# Patient Record
Sex: FEMALE | Race: WHITE | NOT HISPANIC OR LATINO | Employment: FULL TIME | URBAN - METROPOLITAN AREA
[De-identification: names, ages, dates, MRNs, and addresses within clinical notes are randomized per-mention and may not be internally consistent; named-entity substitution may affect disease eponyms.]

---

## 2019-05-27 ENCOUNTER — HOSPITAL ENCOUNTER (EMERGENCY)
Facility: HOSPITAL | Age: 34
Discharge: HOME | End: 2019-05-28
Attending: EMERGENCY MEDICINE
Payer: MEDICAID

## 2019-05-27 DIAGNOSIS — N83.202 HEMORRHAGIC CYST OF LEFT OVARY: Primary | ICD-10-CM

## 2019-05-27 DIAGNOSIS — R10.9 ABDOMINAL PAIN, UNSPECIFIED ABDOMINAL LOCATION: ICD-10-CM

## 2019-05-27 DIAGNOSIS — R00.1 BRADYCARDIA: ICD-10-CM

## 2019-05-27 DIAGNOSIS — B96.89 BACTERIAL VAGINOSIS: ICD-10-CM

## 2019-05-27 DIAGNOSIS — N76.0 BACTERIAL VAGINOSIS: ICD-10-CM

## 2019-05-27 LAB
ALBUMIN SERPL BCP-MCNC: 4.4 G/DL (ref 3.5–5.2)
ALP SERPL-CCNC: 39 U/L (ref 55–135)
ALT SERPL W/O P-5'-P-CCNC: 13 U/L (ref 10–44)
ANION GAP SERPL CALC-SCNC: 6 MMOL/L (ref 8–16)
AST SERPL-CCNC: 15 U/L (ref 10–40)
B-HCG UR QL: NEGATIVE
BACTERIA GENITAL QL WET PREP: ABNORMAL
BASOPHILS # BLD AUTO: 0.01 K/UL (ref 0–0.2)
BASOPHILS NFR BLD: 0.2 % (ref 0–1.9)
BILIRUB SERPL-MCNC: 0.6 MG/DL (ref 0.1–1)
BILIRUB UR QL STRIP: NEGATIVE
BUN SERPL-MCNC: 12 MG/DL (ref 6–20)
CALCIUM SERPL-MCNC: 9.5 MG/DL (ref 8.7–10.5)
CHLORIDE SERPL-SCNC: 107 MMOL/L (ref 95–110)
CLARITY UR: CLEAR
CLUE CELLS VAG QL WET PREP: ABNORMAL
CO2 SERPL-SCNC: 23 MMOL/L (ref 23–29)
COLOR UR: YELLOW
CREAT SERPL-MCNC: 0.9 MG/DL (ref 0.5–1.4)
DIFFERENTIAL METHOD: ABNORMAL
EOSINOPHIL # BLD AUTO: 0.3 K/UL (ref 0–0.5)
EOSINOPHIL NFR BLD: 4.2 % (ref 0–8)
ERYTHROCYTE [DISTWIDTH] IN BLOOD BY AUTOMATED COUNT: 12.8 % (ref 11.5–14.5)
EST. GFR  (AFRICAN AMERICAN): >60 ML/MIN/1.73 M^2
EST. GFR  (NON AFRICAN AMERICAN): >60 ML/MIN/1.73 M^2
FILAMENT FUNGI VAG WET PREP-#/AREA: ABNORMAL
GLUCOSE SERPL-MCNC: 81 MG/DL (ref 70–110)
GLUCOSE UR QL STRIP: NEGATIVE
HCT VFR BLD AUTO: 38 % (ref 37–48.5)
HGB BLD-MCNC: 13.1 G/DL (ref 12–16)
HGB UR QL STRIP: NEGATIVE
KETONES UR QL STRIP: NEGATIVE
LEUKOCYTE ESTERASE UR QL STRIP: NEGATIVE
LIPASE SERPL-CCNC: 17 U/L (ref 4–60)
LYMPHOCYTES # BLD AUTO: 1.6 K/UL (ref 1–4.8)
LYMPHOCYTES NFR BLD: 27.1 % (ref 18–48)
MCH RBC QN AUTO: 31.5 PG (ref 27–31)
MCHC RBC AUTO-ENTMCNC: 34.5 G/DL (ref 32–36)
MCV RBC AUTO: 91 FL (ref 82–98)
MONOCYTES # BLD AUTO: 0.5 K/UL (ref 0.3–1)
MONOCYTES NFR BLD: 9.1 % (ref 4–15)
NEUTROPHILS # BLD AUTO: 3.5 K/UL (ref 1.8–7.7)
NEUTROPHILS NFR BLD: 59.4 % (ref 38–73)
NITRITE UR QL STRIP: NEGATIVE
PH UR STRIP: 6 [PH] (ref 5–8)
PLATELET # BLD AUTO: 202 K/UL (ref 150–350)
PMV BLD AUTO: 10.8 FL (ref 9.2–12.9)
POTASSIUM SERPL-SCNC: 3.8 MMOL/L (ref 3.5–5.1)
PROT SERPL-MCNC: 7.2 G/DL (ref 6–8.4)
PROT UR QL STRIP: NEGATIVE
RBC # BLD AUTO: 4.16 M/UL (ref 4–5.4)
SODIUM SERPL-SCNC: 136 MMOL/L (ref 136–145)
SP GR UR STRIP: 1.01 (ref 1–1.03)
SPECIMEN SOURCE: ABNORMAL
T VAGINALIS GENITAL QL WET PREP: ABNORMAL
URN SPEC COLLECT METH UR: NORMAL
UROBILINOGEN UR STRIP-ACNC: NEGATIVE EU/DL
WBC # BLD AUTO: 5.94 K/UL (ref 3.9–12.7)
WBC #/AREA VAG WET PREP: ABNORMAL
YEAST GENITAL QL WET PREP: ABNORMAL

## 2019-05-27 PROCEDURE — 81025 URINE PREGNANCY TEST: CPT

## 2019-05-27 PROCEDURE — 80053 COMPREHEN METABOLIC PANEL: CPT

## 2019-05-27 PROCEDURE — 96361 HYDRATE IV INFUSION ADD-ON: CPT

## 2019-05-27 PROCEDURE — 93010 ELECTROCARDIOGRAM REPORT: CPT | Mod: ,,, | Performed by: INTERNAL MEDICINE

## 2019-05-27 PROCEDURE — 63600175 PHARM REV CODE 636 W HCPCS: Performed by: EMERGENCY MEDICINE

## 2019-05-27 PROCEDURE — 81003 URINALYSIS AUTO W/O SCOPE: CPT

## 2019-05-27 PROCEDURE — 96375 TX/PRO/DX INJ NEW DRUG ADDON: CPT

## 2019-05-27 PROCEDURE — 25000003 PHARM REV CODE 250: Performed by: EMERGENCY MEDICINE

## 2019-05-27 PROCEDURE — 93010 EKG 12-LEAD: ICD-10-PCS | Mod: ,,, | Performed by: INTERNAL MEDICINE

## 2019-05-27 PROCEDURE — 96374 THER/PROPH/DIAG INJ IV PUSH: CPT

## 2019-05-27 PROCEDURE — 93005 ELECTROCARDIOGRAM TRACING: CPT

## 2019-05-27 PROCEDURE — 87491 CHLMYD TRACH DNA AMP PROBE: CPT

## 2019-05-27 PROCEDURE — 99284 EMERGENCY DEPT VISIT MOD MDM: CPT

## 2019-05-27 PROCEDURE — 36415 COLL VENOUS BLD VENIPUNCTURE: CPT

## 2019-05-27 PROCEDURE — 85025 COMPLETE CBC W/AUTO DIFF WBC: CPT

## 2019-05-27 PROCEDURE — 83690 ASSAY OF LIPASE: CPT

## 2019-05-27 PROCEDURE — 96376 TX/PRO/DX INJ SAME DRUG ADON: CPT

## 2019-05-27 PROCEDURE — 87210 SMEAR WET MOUNT SALINE/INK: CPT

## 2019-05-27 RX ORDER — FENTANYL CITRATE 50 UG/ML
25 INJECTION, SOLUTION INTRAMUSCULAR; INTRAVENOUS ONCE
Status: COMPLETED | OUTPATIENT
Start: 2019-05-27 | End: 2019-05-27

## 2019-05-27 RX ORDER — ONDANSETRON 2 MG/ML
4 INJECTION INTRAMUSCULAR; INTRAVENOUS ONCE
Status: COMPLETED | OUTPATIENT
Start: 2019-05-27 | End: 2019-05-27

## 2019-05-27 RX ORDER — CLONAZEPAM 0.5 MG/1
0.5 TABLET ORAL 2 TIMES DAILY PRN
COMMUNITY

## 2019-05-27 RX ADMIN — FENTANYL CITRATE 25 MCG: 50 INJECTION, SOLUTION INTRAMUSCULAR; INTRAVENOUS at 08:05

## 2019-05-27 RX ADMIN — FENTANYL CITRATE 25 MCG: 50 INJECTION, SOLUTION INTRAMUSCULAR; INTRAVENOUS at 11:05

## 2019-05-27 RX ADMIN — ONDANSETRON 4 MG: 2 INJECTION INTRAMUSCULAR; INTRAVENOUS at 07:05

## 2019-05-27 RX ADMIN — DICYCLOMINE HYDROCHLORIDE 50 ML: 10 SOLUTION ORAL at 07:05

## 2019-05-27 RX ADMIN — SODIUM CHLORIDE 1000 ML: 0.9 INJECTION, SOLUTION INTRAVENOUS at 07:05

## 2019-05-27 NOTE — ED PROVIDER NOTES
"SCRIBE #1 NOTE: I, Regis Martin, am scribing for, and in the presence of, Roxana Cool DO. I have scribed the entire note.       History     Chief Complaint   Patient presents with    Abdominal Pain     pt c/o epigastric abd pain, pelvic pain, decreased appetite, and intermittent abd bloating, 1 week     Review of patient's allergies indicates:  No Known Allergies      History of Present Illness     HPI    5/27/2019, 7:10 PM  History obtained from the patient      History of Present Illness: Jalyn Lim is a 33 y.o. female patient with a PMHx of ovarian cysts who presents to the Emergency Department for evaluation of diffuse abd pain which onset gradually 1.5 weeks ago. Pt reports being "unable to think straight" as a result of her pain, which she describes as a "sharp hunger pain" and rates 10/10.  Symptoms are constant and moderate in severity. Pt reports worsening pain with movement and sitting up. No other mitigating or exacerbating factors reported. Associated sxs include back pain, abd bloat, dizziness, lightheadedness, vomiting, vaginal discharge, vaginal bleeding. Patient denies any dysuria, hematuria, fever, chills, diaphoresis, congestion, CP, palpitations, leg swelling, nausea, neck pain, HA, weakness, and all other sxs at this time. Prior Tx includes ibuprofen taken 7 hours PTA. No further complaints or concerns at this time.         Arrival mode: Personal vehicle     PCP: Primary Doctor No      Past Medical History:  History reviewed. No pertinent medical history.    Past Surgical History:  History reviewed. No pertinent surgical history.    Family History:  History reviewed. No pertinent family history.    Social History:  No tobacco, alcohol, drugs.   Review of Systems     Review of Systems   Constitutional: Negative for chills, diaphoresis and fever.   HENT: Negative for congestion and sore throat.    Respiratory: Negative for shortness of breath.    Cardiovascular: Negative for " chest pain, palpitations and leg swelling.   Gastrointestinal: Positive for abdominal distention, abdominal pain (diffuse) and vomiting. Negative for nausea.   Genitourinary: Positive for vaginal bleeding and vaginal discharge. Negative for dysuria and hematuria.   Musculoskeletal: Positive for back pain. Negative for neck pain.   Skin: Negative for rash.   Neurological: Positive for dizziness and light-headedness. Negative for weakness and headaches.   Hematological: Does not bruise/bleed easily.   All other systems reviewed and are negative.       Physical Exam     Initial Vitals [05/27/19 1830]   BP Pulse Resp Temp SpO2   (!) 89/61 73 20 98.5 °F (36.9 °C) 98 %      MAP       --          Physical Exam  Nursing Notes and Vital Signs Reviewed.  Constitutional: Patient is in mild distress. Well-developed and well-nourished.  Head: Atraumatic. Normocephalic.  Eyes: PERRL. EOM intact. Conjunctivae are not pale. No scleral icterus.  ENT: Mucous membranes are moist. Oropharynx is clear and symmetric.    Neck: Supple. Full ROM. No lymphadenopathy.  Cardiovascular: Regular rate. Regular rhythm. No murmurs, rubs, or gallops. Distal pulses are 2+ and symmetric.  Pulmonary/Chest: No respiratory distress. Clear to auscultation bilaterally. No wheezing or rales.  Abdominal: Soft and non-distended.  There is LLQ tenderness.  No rebound, guarding, or rigidity.  Genitourinary: No CVA tenderness  Musculoskeletal: Moves all extremities. No obvious deformities. No edema.  Skin: Warm and dry.  Neurological:  Alert, awake, and appropriate.  Normal speech.  No acute focal neurological deficits are appreciated.  Psychiatric: Normal affect. Good eye contact. Appropriate in content.     ED Course   Procedures  ED Vital Signs:  Vitals:    05/27/19 1830 05/27/19 1838 05/27/19 1932 05/27/19 2032   BP: (!) 89/61 (!) 102/59  103/68   Pulse: 73 77 (!) 44 62   Resp: 20   18   Temp: 98.5 °F (36.9 °C)      TempSrc: Oral      SpO2: 98% 99%  100%  "  Weight: 63.5 kg (140 lb) 62 kg (136 lb 9.6 oz)     Height: 5' 6" (1.676 m)       05/27/19 2102 05/27/19 2351 05/28/19 0100   BP: 91/60 (!) 88/54 102/66   Pulse: 65 68 60   Resp: 18 17 19   Temp:      TempSrc:      SpO2: 97% 100% 98%   Weight:      Height:          Abnormal Lab Results:  Labs Reviewed   CBC W/ AUTO DIFFERENTIAL - Abnormal; Notable for the following components:       Result Value    Mean Corpuscular Hemoglobin 31.5 (*)     All other components within normal limits   COMPREHENSIVE METABOLIC PANEL - Abnormal; Notable for the following components:    Alkaline Phosphatase 39 (*)     Anion Gap 6 (*)     All other components within normal limits   VAGINAL SCREEN - Abnormal; Notable for the following components:    Clue Cells Occasional (*)     WBC - Vaginal Screen Rare (*)     Bacteria - Vaginal Screen Moderate (*)     All other components within normal limits   C. TRACHOMATIS/N. GONORRHOEAE BY AMP DNA   LIPASE   URINALYSIS, REFLEX TO URINE CULTURE    Narrative:     Preferred Collection Type->Urine, Clean Catch   PREGNANCY TEST, URINE RAPID        All Lab Results:  Results for orders placed or performed during the hospital encounter of 05/27/19   CBC auto differential   Result Value Ref Range    WBC 5.94 3.90 - 12.70 K/uL    RBC 4.16 4.00 - 5.40 M/uL    Hemoglobin 13.1 12.0 - 16.0 g/dL    Hematocrit 38.0 37.0 - 48.5 %    Mean Corpuscular Volume 91 82 - 98 fL    Mean Corpuscular Hemoglobin 31.5 (H) 27.0 - 31.0 pg    Mean Corpuscular Hemoglobin Conc 34.5 32.0 - 36.0 g/dL    RDW 12.8 11.5 - 14.5 %    Platelets 202 150 - 350 K/uL    MPV 10.8 9.2 - 12.9 fL    Gran # (ANC) 3.5 1.8 - 7.7 K/uL    Lymph # 1.6 1.0 - 4.8 K/uL    Mono # 0.5 0.3 - 1.0 K/uL    Eos # 0.3 0.0 - 0.5 K/uL    Baso # 0.01 0.00 - 0.20 K/uL    Gran% 59.4 38.0 - 73.0 %    Lymph% 27.1 18.0 - 48.0 %    Mono% 9.1 4.0 - 15.0 %    Eosinophil% 4.2 0.0 - 8.0 %    Basophil% 0.2 0.0 - 1.9 %    Differential Method Automated    Comprehensive metabolic " panel   Result Value Ref Range    Sodium 136 136 - 145 mmol/L    Potassium 3.8 3.5 - 5.1 mmol/L    Chloride 107 95 - 110 mmol/L    CO2 23 23 - 29 mmol/L    Glucose 81 70 - 110 mg/dL    BUN, Bld 12 6 - 20 mg/dL    Creatinine 0.9 0.5 - 1.4 mg/dL    Calcium 9.5 8.7 - 10.5 mg/dL    Total Protein 7.2 6.0 - 8.4 g/dL    Albumin 4.4 3.5 - 5.2 g/dL    Total Bilirubin 0.6 0.1 - 1.0 mg/dL    Alkaline Phosphatase 39 (L) 55 - 135 U/L    AST 15 10 - 40 U/L    ALT 13 10 - 44 U/L    Anion Gap 6 (L) 8 - 16 mmol/L    eGFR if African American >60 >60 mL/min/1.73 m^2    eGFR if non African American >60 >60 mL/min/1.73 m^2   Lipase   Result Value Ref Range    Lipase 17 4 - 60 U/L   Urinalysis, Reflex to Urine Culture Urine, Clean Catch   Result Value Ref Range    Specimen UA Urine, Clean Catch     Color, UA Yellow Yellow, Straw, Ruby    Appearance, UA Clear Clear    pH, UA 6.0 5.0 - 8.0    Specific Gravity, UA 1.015 1.005 - 1.030    Protein, UA Negative Negative    Glucose, UA Negative Negative    Ketones, UA Negative Negative    Bilirubin (UA) Negative Negative    Occult Blood UA Negative Negative    Nitrite, UA Negative Negative    Urobilinogen, UA Negative <2.0 EU/dL    Leukocytes, UA Negative Negative   Pregnancy, urine rapid   Result Value Ref Range    Preg Test, Ur Negative    Vaginal Screen   Result Value Ref Range    Trichomonas None None    Clue Cells Occasional (A) None    Budding Yeast None None    Fungal Hyphae None None    WBC - Vaginal Screen Rare (A) None    Bacteria - Vaginal Screen Moderate (A) None    Wet Prep Source VAG None         Imaging Results:  Imaging Results          US Pelvis Comp with Transvag NON-OB (xpd) (Final result)  Result time 05/27/19 23:15:52   Procedure changed from US Pelvis Complete Non OB     Final result by Sherif Massey MD (05/27/19 23:15:52)                 Impression:      Complex probably hemorrhagic follicle left ovary, 1.8 cm.  Otherwise, unremarkable transabdominal and transvaginal  pelvic ultrasound.      Electronically signed by: Sherif Massey MD  Date:    05/27/2019  Time:    23:15             Narrative:    EXAMINATION:  US PELVIS COMP WITH TRANSVAG NON-OB (XPD)    CLINICAL HISTORY:  pelvic pain; left-sided pelvic pain.    FINDINGS:  Transabdominal and transvaginal pelvic ultrasound performed.  Uterus measures 10.3 x 5.1 x 6.2 cm with normal homogeneous echogenicity.  Normal endometrium, 11 mm.  Trace pelvic cul-de-sac free fluid.  Normal right ovary, 2.2 x 1.1 x s 2.0 cm.  Left ovary, 3.3 x 2.0 x 2.4 cm, with a heterogeneous complex/hemorrhagic follicle, 1.5 x 1.2 x 1.8 cm, with peripheral color Doppler flow.                                 The EKG was ordered, reviewed, and independently interpreted by the ED provider.  Interpretation time: 5:39 PM  Rate: 44 BPM  Rhythm: Marked sinus bradycardia.  Interpretation: Cannot r/o anterior infarct. No STEMI.             The Emergency Provider reviewed the vital signs and test results, which are outlined above.     ED Discussion     8:06 PM: Pelvic exam was performed on pt. A female chaperone was present for this examination. Nl external inspection. No lesions or abnormalities were visible on the labia majora or minora. Cervical os is closed. There is no CMT. There is no blood in the vaginal vault. White non-inherent discharge. No adnexal tenderness. No adnexal masses.    11:15 PM: Re-evaluated pt. Pt reports worsening abd pain at this time. Answered all questions. Pt expresses understanding at this time.  Abdominal:  Soft and non-distended.  There is suprapubic tenderness.  No rebound, guarding, or rigidity.  No organomegaly. No pulsatile mass. Normal bowel sounds.    12:52 AM: Re-evaluated pt. Pt is resting comfortably and is in no acute distress..  D/w pt all pertinent results. D/w pt any concerns expressed at this time. Answered all questions. Pt expresses understanding at this time.  Abdominal:  Soft and non-distended.  There is no tenderness.   No rebound, guarding, or rigidity.  No organomegaly. No pulsatile mass. Normal bowel sounds.    12:55 AM:  Pt slightly hypotensive.  Pt informs Dr. Cool that her baseline is hypotensive.  Patient does not feel lightheaded or dizzy.    12:56 AM: Reassessed pt at this time.  Pt states her condition has improved at this time. Discussed with pt all pertinent ED information and results. Discussed pt dx and plan of tx. Gave pt all f/u and return to the ED instructions. All questions and concerns were addressed at this time. Pt expresses understanding of information and instructions, and is comfortable with plan to discharge. Pt is stable for discharge.     reviewed,  I discussed with the patient/guardian regarding the the quantity of the opioid.  I discussed the patient/guardian's option to fill the prescription in a lesser quantity.   I also discussed with the patient/guardian the risks associated with the opioid.              ED Medication(s):  Medications   sodium chloride 0.9% bolus 1,000 mL (0 mLs Intravenous Stopped 5/27/19 2048)   GI cocktail (mylanta 30 mL, lidocaine 2 % viscous 10 mL, dicyclomine 10 mL) 50 mL (50 mLs Oral Given 5/27/19 1937)   fentaNYL injection 25 mcg (25 mcg Intravenous Given 5/27/19 2045)   ondansetron injection 4 mg (4 mg Intravenous Given 5/27/19 1937)   fentaNYL injection 25 mcg (25 mcg Intravenous Given 5/27/19 2346)   ketorolac tablet 10 mg (10 mg Oral Given 5/28/19 0120)       Discharge Medication List as of 5/28/2019 12:57 AM      START taking these medications    Details   HYDROcodone-acetaminophen (NORCO) 7.5-325 mg per tablet Take 1 tablet by mouth every 6 (six) hours as needed for Pain., Starting Tue 5/28/2019, Print      ibuprofen (ADVIL,MOTRIN) 600 MG tablet Take 1 tablet (600 mg total) by mouth every 6 (six) hours as needed., Starting Tue 5/28/2019, Print      metroNIDAZOLE (FLAGYL) 500 MG tablet Take 1 tablet (500 mg total) by mouth every 12 (twelve) hours. for 7 days,  Starting Tue 5/28/2019, Until Tue 6/4/2019, Print             Follow-up Information     [facility] In 2 days.    Specialty:  Obstetrics and Gynecology  Why:  Or OB gyn of choice.  Return to emergency department for passing out, fever, severe pain, or other concerns.  Contact information:  [address]  [facility] [address] [phone]                       Medical Decision Making:   History:   Old Medical Records: I decided to obtain old medical records.  Old Records Summarized: records from previous admission(s).       <> Summary of Records: CT ABDOMEN PELVIS W IV CONTRAST    Indication: Epigastric and right upper quadrant tenderness with a normal right upper quadrant ultrasound    Comparison:6/27/2016    Findings: Scans through the abdomen and pelvis were performed with IV contrast.  Automated exposure technique was utilized for dose reduction.    Limited assessment of the bowel without oral contrast.    Lung bases are clear.    Abdomen: No free air or free fluid. No dilated bowel loops or bowel wall thickening. Periportal edema in the liver. The gallbladder is nondilated. No masses, fluid collections or adenopathy. The vascular structures are patent. No destructive bone lesion.    Pelvis: Trace amount of free fluid in posterior cul-de-sac. No dilated bowel loops or bowel wall thickening. No urinary bladder wall thickening. Distal ureters are nondilated.   No masses, fluid collections or adenopathy. The vascular structures are patent. No destructive bone lesion.    Impression:   1.  Nonspecific trace free fluid in the pelvis. There is also periportal edema which is nonspecific but could represent underlying inflammatory or infectious process in the abdomen or liver.  Other Result Information  Interface, Rad Results In - 05/20/2019 10:02 PM CDT  CT ABDOMEN PELVIS W IV CONTRAST    Indication: Epigastric and right upper quadrant tenderness with a normal right upper quadrant  ultrasound    Comparison:6/27/2016    Findings: Scans through the abdomen and pelvis were performed with IV contrast.  Automated exposure technique was utilized for dose reduction.    Limited assessment of the bowel without oral contrast.    Lung bases are clear.    Abdomen: No free air or free fluid. No dilated bowel loops or bowel wall thickening. Periportal edema in the liver. The gallbladder is nondilated. No masses, fluid collections or adenopathy. The vascular structures are patent. No destructive bone lesion.    Pelvis: Trace amount of free fluid in posterior cul-de-sac. No dilated bowel loops or bowel wall thickening. No urinary bladder wall thickening. Distal ureters are nondilated.   No masses, fluid collections or adenopathy. The vascular structures are patent. No destructive bone lesion.    Impression:   1.  Nonspecific trace free fluid in the pelvis. There is also periportal edema which is nonspecific but could represent underlying inflammatory or infectious process in the abdomen or liver.  Status Results Details  Result Impression  No acute abnormalities.  Result Narrative  US ABDOMEN LIMITED    Indication: Epigastric/RUQ pain with nausea    Comparison:  none    Findings:    The liver is normal in size and echotexture.  There is no evidence of liver mass.     There is hepatopedal flow in the main portal vein. The visualized IVC is patent.      The visualized portions of the pancreas is within normal limits.     The common bile duct is normal in caliber and measures 3 mm in diameter.    No gallstones, gallbladder wall thickening, or pericholecystic fluid.    The right kidney is measuring 10 cm. Cortical echotexture is normal. No solid mass, shadowing calculus, or hydronephrosis is seen.  Other Result Information  Interface, Rad Results In - 05/20/2019  8:57 PM CDT  US ABDOMEN LIMITED    Indication: Epigastric/RUQ pain with nausea    Comparison:  none    Findings:    The liver is normal in size and  echotexture.  There is no evidence of liver mass.     There is hepatopedal flow in the main portal vein. The visualized IVC is patent.      The visualized portions of the pancreas is within normal limits.     The common bile duct is normal in caliber and measures 3 mm in diameter.    No gallstones, gallbladder wall thickening, or pericholecystic fluid.    The right kidney is measuring 10 cm. Cortical echotexture is normal. No solid mass, shadowing calculus, or hydronephrosis is seen.    IMPRESSION:  No acute abnormalities.          Initial Assessment:   Patient is a 33-year-old female with prior history of ovarian cysts/ruptured, presenting to emergency department with several days history of abdominal pain.  Is associated with nausea vomiting. Patient evaluated outside facility.  CT and ultrasound reviewed see MDM  Differential Diagnosis:   Ovarian cyst, STI, bacterial vaginosis, urinary tract infection, kidney stone, ectopic pregnant  Clinical Tests:   Lab Tests: Reviewed and Ordered  Radiological Study: Reviewed and Ordered  Medical Tests: Reviewed and Ordered  ED Management:  Patient was given 1 L normal saline bolus.  Patient noted to be bradycardic-EKG showed sinus bradycardia.  No acute ST or T-wave changes noted. Patient was given analgesic and antiemetic in the emergency room.  Patient underwent serial abdominal exam.  Pregnancy test negative. Pelvic ultrasound demonstrated 1.5 cm hemorrhagic follicle to the left ovary.  Patient's pain was controlled.  Despite patient having low blood pressure and relative bradycardia, patient was asymptomatic.  She states this is baseline for her.  Patient will follow up with her gynecologist this week.  All questions answered             Scribe Attestation:   Scribe #1: I performed the above scribed service and the documentation accurately describes the services I performed. I attest to the accuracy of the note.     Attending:   Physician Attestation Statement for Scribe  #1: I, Roxana Cool DO, personally performed the services described in this documentation, as scribed by Regis Martin, in my presence, and it is both accurate and complete.           Clinical Impression       ICD-10-CM ICD-9-CM   1. Hemorrhagic cyst of left ovary N83.202 620.2   2. Bradycardia R00.1 427.89   3. Abdominal pain, unspecified abdominal location R10.9 789.00   4. Bacterial vaginosis N76.0 616.10    B96.89 041.9       Disposition:   Disposition: Discharged  Condition: Stable         Roxana Cool DO  05/28/19 0327

## 2019-05-28 VITALS
BODY MASS INDEX: 21.96 KG/M2 | OXYGEN SATURATION: 98 % | DIASTOLIC BLOOD PRESSURE: 66 MMHG | SYSTOLIC BLOOD PRESSURE: 102 MMHG | RESPIRATION RATE: 19 BRPM | TEMPERATURE: 99 F | WEIGHT: 136.63 LBS | HEIGHT: 66 IN | HEART RATE: 60 BPM

## 2019-05-28 LAB
C TRACH DNA SPEC QL NAA+PROBE: NOT DETECTED
N GONORRHOEA DNA SPEC QL NAA+PROBE: NOT DETECTED

## 2019-05-28 PROCEDURE — 25000003 PHARM REV CODE 250: Performed by: EMERGENCY MEDICINE

## 2019-05-28 RX ORDER — IBUPROFEN 600 MG/1
600 TABLET ORAL EVERY 6 HOURS PRN
Qty: 40 TABLET | Refills: 0 | Status: SHIPPED | OUTPATIENT
Start: 2019-05-28

## 2019-05-28 RX ORDER — KETOROLAC TROMETHAMINE 10 MG/1
10 TABLET, FILM COATED ORAL
Status: COMPLETED | OUTPATIENT
Start: 2019-05-28 | End: 2019-05-28

## 2019-05-28 RX ORDER — HYDROCODONE BITARTRATE AND ACETAMINOPHEN 7.5; 325 MG/1; MG/1
1 TABLET ORAL EVERY 6 HOURS PRN
Qty: 20 TABLET | Refills: 0 | Status: SHIPPED | OUTPATIENT
Start: 2019-05-28

## 2019-05-28 RX ORDER — METRONIDAZOLE 500 MG/1
500 TABLET ORAL EVERY 12 HOURS
Qty: 14 TABLET | Refills: 0 | Status: SHIPPED | OUTPATIENT
Start: 2019-05-28 | End: 2019-06-04

## 2019-05-28 RX ADMIN — KETOROLAC TROMETHAMINE 10 MG: 10 TABLET, FILM COATED ORAL at 01:05

## 2019-05-28 NOTE — DISCHARGE INSTRUCTIONS
Pain Medication Interaction warning.    Do not take any sedative medications (benadryl, ativan, xanax, Klonopin, trazadone, clonazepam); medicine for sleep; other pain pills (lortab, percocet), alcohol, with your narcotic prescription.  This could lead to an accidental overdose and possible death.  Do not drive or operate heavy equipment.